# Patient Record
Sex: MALE | Race: BLACK OR AFRICAN AMERICAN | NOT HISPANIC OR LATINO | Employment: FULL TIME | URBAN - METROPOLITAN AREA
[De-identification: names, ages, dates, MRNs, and addresses within clinical notes are randomized per-mention and may not be internally consistent; named-entity substitution may affect disease eponyms.]

---

## 2017-02-16 ENCOUNTER — OFFICE VISIT (OUTPATIENT)
Dept: ORTHOPEDICS | Facility: CLINIC | Age: 32
End: 2017-02-16

## 2017-02-16 VITALS
HEIGHT: 71 IN | DIASTOLIC BLOOD PRESSURE: 81 MMHG | HEART RATE: 75 BPM | BODY MASS INDEX: 26.04 KG/M2 | SYSTOLIC BLOOD PRESSURE: 133 MMHG | WEIGHT: 186 LBS

## 2017-02-16 DIAGNOSIS — M65.4 RADIAL STYLOID TENOSYNOVITIS: Primary | ICD-10-CM

## 2017-02-16 NOTE — MR AVS SNAPSHOT
"              After Visit Summary   2017    Said Aidid    MRN: [id number]           Patient Information     Date Of Birth          1985        Visit Information        Provider Department      2017 3:45 PM Jak Jefferson MD; [facility]        Today's Diagnoses     Radial styloid tenosynovitis    -  1       Follow-ups after your visit        Who to contact     Please call your clinic at [phone] to:    Ask questions about your health    Make or cancel appointments    Discuss your medicines    Learn about your test results    Speak to your doctor   If you have compliments or concerns about an experience at your clinic, or if you wish to file a complaint, please contact [facility] Patient Relations at [phone] or email us at [email or web address]         Additional Information About Your Visit        MyChart Information     VeriShow is an electronic gateway that provides easy, online access to your medical records. With VeriShow, you can request a clinic appointment, read your test results, renew a prescription or communicate with your care team.     To sign up for CareToSavet visit the website at [email or web address]   You will be asked to enter the access code listed below, as well as some personal information. Please follow the directions to create your username and password.     Your access code is: [email or web address]  Expires: 5/10/2017  6:30 AM     Your access code will  in 90 days. If you need help or a new code, please contact your [facility] or call [phone] for assistance.        Care EveryWhere ID     This is your Care EveryWhere ID. This could be used by other organizations to access your [facility] medical records  [id number]        Your Vitals Were     Pulse Height BMI (Body Mass Index)             75 5' 10.5\" (1.791 m) 26.31 kg/m2          Blood Pressure from " Last 3 Encounters:   02/16/17 133/81    Weight from Last 3 Encounters:   02/16/17 186 lb (84.4 kg)              Today, you had the following     No orders found for display       Primary Care Provider Office Phone # Fax #    Valencia Sheppard [phone]       [place] [id number] [facility] [address]        Thank you!     Thank you for choosing [facility]  for your care. Our goal is always to provide you with excellent care. Hearing back from our patients is one way we can continue to improve our services. Please take a few minutes to complete the written survey that you may receive in the mail after your visit with us. Thank you!             Your Updated Medication List - Protect others around you: Learn how to safely use, store and throw away your medicines at www.disposemymeds.org.      Notice  As of 2/16/2017 11:59 PM    You have not been prescribed any medications.

## 2017-02-16 NOTE — PROGRESS NOTES
"Sports Medicine Clinic Visit    PCP: Valencia Sheppard    Said Aron is a 32 year old male who is seen  in consultation at the request of [facility] presenting with right wrist pain. He was sleeping one night with numbness and tingling with wrist flexion.    Injury: Sleeping with wrist flexion    Location of Pain: right wrist  Duration of Pain: 2 months  Rating of Pain: 5/10  Pain is better with: Nothing  Pain is worse with: Wrist AROM, carrying objects  Additional Features: None  Treatment so far consists of: Ice  Prior History of related problems: None    /81 (BP Location: Right arm, Cuff Size: Adult Regular)  Pulse 75  Ht 5' 10.5\" (1.791 m)  Wt 186 lb (84.4 kg)  BMI 26.31 kg/m2       PMH:  No past medical history on file.    Active problem list:  There is no problem list on file for this patient.      FH:  No family history on file.    SH:  Social History     Social History     Marital status:      Spouse name: N/A     Number of children: N/A     Years of education: N/A     Occupational History     Not on file.     Social History Main Topics     Smoking status: Not on file     Smokeless tobacco: Not on file     Alcohol use Not on file     Drug use: Not on file     Sexual activity: Not on file     Other Topics Concern     Not on file     Social History Narrative       MEDS:  See EMR, reviewed  ALL:  See EMR, reviewed    REVIEW OF SYSTEMS:  CONSTITUTIONAL:NEGATIVE for fever, chills, change in weight  INTEGUMENTARY/SKIN: NEGATIVE for worrisome rashes, moles or lesions  EYES: NEGATIVE for vision changes or irritation  ENT/MOUTH: NEGATIVE for ear, mouth and throat problems  RESP:NEGATIVE for significant cough or SOB  BREAST: NEGATIVE for masses, tenderness or discharge  CV: NEGATIVE for chest pain, palpitations or peripheral edema  GI: NEGATIVE for nausea, abdominal pain, heartburn, or change in bowel habits  :NEGATIVE for frequency, dysuria, or hematuria  :NEGATIVE for frequency, dysuria, or " hematuria  NEURO: NEGATIVE for weakness, dizziness or paresthesias  ENDOCRINE: NEGATIVE for temperature intolerance, skin/hair changes  HEME/ALLERGY/IMMUNE: NEGATIVE for bleeding problems  PSYCHIATRIC: NEGATIVE for changes in mood or affect        SUBJECTIVE:  This 32-year-old right-handed Native  male was seen today with an  on a self-referral for right-sided first dorsal compartment wrist discomfort that he has had over the last 6 weeks.  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  He has never had trouble with his wrist before.  He cannot think of any new demands or job requirements in the last 2 months that might have bothered this but he does admit that his job is repetitive gripping and grasping and lifting.  Interestingly, he now has 3 months off from work and he presents today for evaluation of this problem.      OBJECTIVE:  He is tender in the first dorsal compartment over the radial styloid and a Finkelstein test is positive.  He is nontender at the lunate or at the scaphoid, nontender at the distal radius or ulna.  Radial compression and ulnar compression are without discomfort.  Grasp strength at the hand is normal.  There is no numbness and tingling into the fingers.  Capillary refill is normal.      ASSESSMENT:  De Quervain tenosynovitis of the right wrist.      PLAN:  It is very favorable that he has 3 months off from work and he will try to avoid excessive gripping and grasping with this wrist and thumb during that time.  He was given a thumb spica brace to wear for the next 2 weeks during the day, taking it off for simple daily activities that do not require gripping and grabbing.  He will do localized ice massage and friction massage along the tendon and expect to see some improvements in that period of time.  Then he can discontinue the brace but still try to avoid a lot of extra gripping and grabbing before he goes back to work  in 3 months.  It should really resolve in that time.  If it is not making improvements, he knows a cortisone shot could be tried.

## 2017-02-16 NOTE — LETTER
"   2/16/2017      RE: Said Aron  [address]  [facility] [address]       Sports Medicine Clinic Visit    PCP: Valencia Sheppard    Said Aron is a 32 year old male who is seen  in consultation at the request of [facility] presenting with right wrist pain. He was sleeping one night with numbness and tingling with wrist flexion.    Injury: Sleeping with wrist flexion    Location of Pain: right wrist  Duration of Pain: 2 months  Rating of Pain: 5/10  Pain is better with: Nothing  Pain is worse with: Wrist AROM, carrying objects  Additional Features: None  Treatment so far consists of: Ice  Prior History of related problems: None    /81 (BP Location: Right arm, Cuff Size: Adult Regular)  Pulse 75  Ht 5' 10.5\" (1.791 m)  Wt 186 lb (84.4 kg)  BMI 26.31 kg/m2       PMH:  No past medical history on file.    Active problem list:  There is no problem list on file for this patient.      FH:  No family history on file.    SH:  Social History     Social History     Marital status:      Spouse name: N/A     Number of children: N/A     Years of education: N/A     Occupational History     Not on file.     Social History Main Topics     Smoking status: Not on file     Smokeless tobacco: Not on file     Alcohol use Not on file     Drug use: Not on file     Sexual activity: Not on file     Other Topics Concern     Not on file     Social History Narrative       MEDS:  See EMR, reviewed  ALL:  See EMR, reviewed    REVIEW OF SYSTEMS:  CONSTITUTIONAL:NEGATIVE for fever, chills, change in weight  INTEGUMENTARY/SKIN: NEGATIVE for worrisome rashes, moles or lesions  EYES: NEGATIVE for vision changes or irritation  ENT/MOUTH: NEGATIVE for ear, mouth and throat problems  RESP:NEGATIVE for significant cough or SOB  BREAST: NEGATIVE for masses, tenderness or discharge  CV: NEGATIVE for chest pain, palpitations or peripheral edema  GI: NEGATIVE for nausea, abdominal pain, heartburn, or change in bowel habits  :NEGATIVE for " frequency, dysuria, or hematuria  :NEGATIVE for frequency, dysuria, or hematuria  NEURO: NEGATIVE for weakness, dizziness or paresthesias  ENDOCRINE: NEGATIVE for temperature intolerance, skin/hair changes  HEME/ALLERGY/IMMUNE: NEGATIVE for bleeding problems  PSYCHIATRIC: NEGATIVE for changes in mood or affect        SUBJECTIVE:  This 32-year-old right-handed Native  male was seen today with an  on a self-referral for right-sided first dorsal compartment wrist discomfort that he has had over the last 6 weeks.  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  He has never had trouble with his wrist before.  He cannot think of any new demands or job requirements in the last 2 months that might have bothered this but he does admit that his job is repetitive gripping and grasping and lifting.  Interestingly, he now has 3 months off from work and he presents today for evaluation of this problem.      OBJECTIVE:  He is tender in the first dorsal compartment over the radial styloid and a Finkelstein test is positive.  He is nontender at the lunate or at the scaphoid, nontender at the distal radius or ulna.  Radial compression and ulnar compression are without discomfort.  Grasp strength at the hand is normal.  There is no numbness and tingling into the fingers.  Capillary refill is normal.      ASSESSMENT:  De Quervain tenosynovitis of the right wrist.      PLAN:  It is very favorable that he has 3 months off from work and he will try to avoid excessive gripping and grasping with this wrist and thumb during that time.  He was given a thumb spica brace to wear for the next 2 weeks during the day, taking it off for simple daily activities that do not require gripping and grabbing.  He will do localized ice massage and friction massage along the tendon and expect to see some improvements in that period of time.  Then he can discontinue the brace but still try to  avoid a lot of extra gripping and grabbing before he goes back to work in 3 months.  It should really resolve in that time.  If it is not making improvements, he knows a cortisone shot could be tried.     Jak Jefferson MD

## 2022-07-14 ENCOUNTER — MEDICAL CORRESPONDENCE (OUTPATIENT)
Dept: HEALTH INFORMATION MANAGEMENT | Facility: CLINIC | Age: 37
End: 2022-07-14

## 2022-07-20 ENCOUNTER — TRANSCRIBE ORDERS (OUTPATIENT)
Dept: OTHER | Age: 37
End: 2022-07-20

## 2022-07-20 DIAGNOSIS — R06.02 SHORTNESS OF BREATH: Primary | ICD-10-CM

## 2022-07-25 ENCOUNTER — ANCILLARY PROCEDURE (OUTPATIENT)
Dept: GENERAL RADIOLOGY | Facility: CLINIC | Age: 37
End: 2022-07-25
Attending: OBSTETRICS & GYNECOLOGY
Payer: COMMERCIAL

## 2022-07-25 DIAGNOSIS — R07.89 CHEST WALL PAIN: ICD-10-CM

## 2022-07-25 PROCEDURE — 71045 X-RAY EXAM CHEST 1 VIEW: CPT | Mod: GC | Performed by: RADIOLOGY

## 2022-08-15 ENCOUNTER — TRANSCRIBE ORDERS (OUTPATIENT)
Dept: OTHER | Age: 37
End: 2022-08-15

## 2022-08-19 DIAGNOSIS — R06.02 SHORTNESS OF BREATH: ICD-10-CM

## 2022-08-19 PROCEDURE — 94375 RESPIRATORY FLOW VOLUME LOOP: CPT | Performed by: INTERNAL MEDICINE

## 2022-08-19 PROCEDURE — 94729 DIFFUSING CAPACITY: CPT | Performed by: INTERNAL MEDICINE

## 2022-08-19 PROCEDURE — 94726 PLETHYSMOGRAPHY LUNG VOLUMES: CPT | Performed by: INTERNAL MEDICINE

## 2022-08-20 LAB
DLCOUNC-%PRED-PRE: 89 %
DLCOUNC-PRE: 28.44 ML/MIN/MMHG
DLCOUNC-PRED: 31.92 ML/MIN/MMHG
ERV-%PRED-PRE: 55 %
ERV-PRE: 0.83 L
ERV-PRED: 1.49 L
EXPTIME-PRE: 7.26 SEC
FEF2575-%PRED-PRE: 98 %
FEF2575-PRE: 3.72 L/SEC
FEF2575-PRED: 3.77 L/SEC
FEFMAX-%PRED-PRE: 84 %
FEFMAX-PRE: 7.93 L/SEC
FEFMAX-PRED: 9.44 L/SEC
FEV1-%PRED-PRE: 87 %
FEV1-PRE: 3.27 L
FEV1FEV6-PRE: 86 %
FEV1FEV6-PRED: 84 %
FEV1FVC-PRE: 86 %
FEV1FVC-PRED: 82 %
FEV1SVC-PRE: 84 %
FEV1SVC-PRED: 67 %
FIFMAX-PRE: 6.37 L/SEC
FRCPLETH-%PRED-PRE: 81 %
FRCPLETH-PRE: 2.76 L
FRCPLETH-PRED: 3.39 L
FVC-%PRED-PRE: 84 %
FVC-PRE: 3.82 L
FVC-PRED: 4.52 L
IC-%PRED-PRE: 74 %
IC-PRE: 3.05 L
IC-PRED: 4.07 L
RVPLETH-%PRED-PRE: 103 %
RVPLETH-PRE: 1.93 L
RVPLETH-PRED: 1.87 L
TLCPLETH-%PRED-PRE: 81 %
TLCPLETH-PRE: 5.81 L
TLCPLETH-PRED: 7.13 L
VA-%PRED-PRE: 73 %
VA-PRE: 4.9 L
VC-%PRED-PRE: 69 %
VC-PRE: 3.88 L
VC-PRED: 5.57 L
